# Patient Record
Sex: MALE | Race: WHITE | Employment: FULL TIME | URBAN - METROPOLITAN AREA
[De-identification: names, ages, dates, MRNs, and addresses within clinical notes are randomized per-mention and may not be internally consistent; named-entity substitution may affect disease eponyms.]

---
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
## 2024-07-08 ENCOUNTER — OFFICE VISIT (OUTPATIENT)
Dept: GASTROENTEROLOGY | Age: 24
End: 2024-07-08

## 2024-07-08 VITALS
HEIGHT: 73 IN | BODY MASS INDEX: 20.81 KG/M2 | DIASTOLIC BLOOD PRESSURE: 71 MMHG | SYSTOLIC BLOOD PRESSURE: 118 MMHG | HEART RATE: 78 BPM | WEIGHT: 157 LBS

## 2024-07-08 DIAGNOSIS — K85.20 ALCOHOL-INDUCED ACUTE PANCREATITIS, UNSPECIFIED COMPLICATION STATUS: Primary | ICD-10-CM

## 2024-07-08 PROCEDURE — 99205 OFFICE O/P NEW HI 60 MIN: CPT | Performed by: INTERNAL MEDICINE

## 2024-07-08 NOTE — PROGRESS NOTES
Reason for Referral:   Delfino Lehman MD  [address] [place] [address]    Chief Complaint   Patient presents with    New Patient           HISTORY OF PRESENT ILLNESS: Mr.Caleb Christos Caldera is a 24 y.o. male with a pasthistory remarkable for chronic abdominal pain, referred for evaluation of acute pancreatitis.  He had an episode of acute pancreatitis on May 5, 2024 after  he drank about 100 ml of whiskey. He was taken to ED at [facility] where he had CT-scan which showed- Acute interstitial edematous pancreatitis with encasing peripancreatic fluid collection.  Ductal dilatation at the tail; secondary stricture not excluded   His serum lipase was >700.  He does c/o- episodes of pain since his early childhood but was never evaluated for that.    No c/o- nausea, vomiting, fever, loss of appetite, weight loss, bloating, bleeding ID, diarrhea, nocturnal diarrhea, tenesmus    His grandfather  of pancreatic cancer. No F/H of acute pancreatitis in other family members.    Past Medical,Family, and Social History reviewed and does contribute to the patient presentingcondition.     Rafael hoffman's PMH/PSH,SH,PSYCH Hx, MEDs, ALLERGIES, and ROS were all reviewed and updated in the appropriate sections.    PAST MEDICAL HISTORY:  No past medical history on file.    No past surgical history on file.    CURRENT MEDICATIONS:  No current outpatient medications on file.    ALLERGIES:   No Known Allergies    FAMILY HISTORY: No family history on file.      SOCIAL HISTORY:   Social History     Socioeconomic History    Marital status:      Spouse name: Not on file    Number of children: Not on file    Years of education: Not on file    Highest education level: Not on file   Occupational History    Not on file   Tobacco Use    Smoking status: Never    Smokeless tobacco: Never   Substance and Sexual Activity    Alcohol use: No     Alcohol/week: 0.0 standard drinks of alcohol    Drug use: No    Sexual activity: Not on file

## 2024-07-09 ENCOUNTER — TELEPHONE (OUTPATIENT)
Dept: GASTROENTEROLOGY | Age: 24
End: 2024-07-09

## 2024-07-09 NOTE — TELEPHONE ENCOUNTER
Pharmacy called to ask about the directions on Creon that was sent in yesterday: Is it suppose to be taken three times daily?